# Patient Record
(demographics unavailable — no encounter records)

---

## 2024-11-08 NOTE — HISTORY OF PRESENT ILLNESS
[de-identified] : 8 year F with speech delay.   Normal birth and pregnancy. No oxygen, jaundice, NICU, prematurity, or antibiotics at birth.  Negative otologic history with no ear infections, pain or drainage.  No prior otologic surgery.  Receptive language seems intact (follows commands, answers to name, etc) Otherwise was on time with gross and fine motor milestones No family history of hearing loss or syndromes at an early age. Not receiving speech services. Passed [facility] some snoring. no apneas or pauses.  sleeping well otherwise.

## 2024-11-08 NOTE — PHYSICAL EXAM
[Complete] : complete cerumen impaction [3+] : 3+ [Normal Gait and Station] : normal gait and station [Normal muscle strength, symmetry and tone of facial, head and neck musculature] : normal muscle strength, symmetry and tone of facial, head and neck musculature [Normal] : no cervical lymphadenopathy [Exposed Vessel] : left anterior vessel not exposed [Wheezing] : no wheezing [Increased Work of Breathing] : no increased work of breathing with use of accessory muscles and retractions

## 2024-11-08 NOTE — ASSESSMENT
[FreeTextEntry1] : 8 year female Speech delay. Audio today consistent with normal hearing  Wax removed from the left.  Recommend speech services  Consider developmental peds referral for socio-communicative disorders.  Also ATH and snoring. Discussed with the parent regarding sleep observation by going into their kids room a few times a week and watch them sleep for 5-10 min at varying times of the night to monitor snoring, apneas or gasping, signs of struggling to breath, restlessness, or other signs of SDB.  Sometimes we consider ordering a sleep study if highly concerned. Can discuss findings at next appointment.  RTC 3 months with audio